# Patient Record
Sex: MALE | Race: WHITE
[De-identification: names, ages, dates, MRNs, and addresses within clinical notes are randomized per-mention and may not be internally consistent; named-entity substitution may affect disease eponyms.]

---

## 2019-10-12 ENCOUNTER — HOSPITAL ENCOUNTER (EMERGENCY)
Dept: HOSPITAL 46 - ED | Age: 49
Discharge: HOME | End: 2019-10-12
Payer: COMMERCIAL

## 2019-10-12 VITALS — HEIGHT: 71 IN | WEIGHT: 184.99 LBS | BODY MASS INDEX: 25.9 KG/M2

## 2019-10-12 DIAGNOSIS — S22.41XA: Primary | ICD-10-CM
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
## 2019-10-12 PROCEDURE — A9270 NON-COVERED ITEM OR SERVICE: HCPCS

## 2019-10-12 NOTE — NUR
I CHECKED IN ON THIS PT AS HE IS KNOWN TO ME. I AM ARRANGING FOR THE PT TO
HAVE A RIDE HOME AND WILL GET HIM SOME FOOD BEFORE HE GOES HOME.

## 2019-10-14 ENCOUNTER — HOSPITAL ENCOUNTER (EMERGENCY)
Dept: HOSPITAL 46 - ED | Age: 49
Discharge: HOME | End: 2019-10-14
Payer: COMMERCIAL

## 2019-10-14 VITALS — BODY MASS INDEX: 0.12 KG/M2 | WEIGHT: 0.83 LBS | HEIGHT: 71 IN

## 2019-10-14 DIAGNOSIS — F17.200: ICD-10-CM

## 2019-10-14 DIAGNOSIS — S22.41XD: ICD-10-CM

## 2019-10-14 DIAGNOSIS — S27.0XXD: Primary | ICD-10-CM
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
## 2020-08-04 ENCOUNTER — HOSPITAL ENCOUNTER (EMERGENCY)
Dept: HOSPITAL 46 - ED | Age: 50
Discharge: HOME | End: 2020-08-04
Payer: MEDICARE

## 2020-08-04 VITALS — WEIGHT: 170.99 LBS | BODY MASS INDEX: 23.94 KG/M2 | HEIGHT: 71 IN

## 2020-08-04 DIAGNOSIS — S50.312A: ICD-10-CM
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
## 2020-08-04 DIAGNOSIS — S80.212A: ICD-10-CM

## 2020-08-04 DIAGNOSIS — S50.311A: ICD-10-CM

## 2020-08-04 DIAGNOSIS — S00.83XA: Primary | ICD-10-CM

## 2020-08-04 DIAGNOSIS — F17.200: ICD-10-CM

## 2020-08-04 DIAGNOSIS — S80.211A: ICD-10-CM
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
## 2020-08-12 ENCOUNTER — HOSPITAL ENCOUNTER (EMERGENCY)
Dept: HOSPITAL 46 - ED | Age: 50
Discharge: HOME | End: 2020-08-12
Payer: MEDICARE

## 2020-08-12 VITALS — BODY MASS INDEX: 23.94 KG/M2 | WEIGHT: 170.99 LBS | HEIGHT: 71 IN

## 2020-08-12 DIAGNOSIS — S22.32XA: Primary | ICD-10-CM
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
## 2020-08-12 DIAGNOSIS — F17.200: ICD-10-CM
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
## 2020-08-12 NOTE — EKG
[facility]
                                    [address]
                                  [place], [place]  [address]
_________________________________________________________________________________________
                                                                 Signed   
 
 
Normal sinus rhythm
Left axis deviation
Abnormal ECG
No previous ECGs available
Confirmed by SUKHWINDER PANIAGUA MD ([id number]) on 8/12/2020 4:14:55 PM
 
 
 
 
 
 
 
 
 
 
 
 
 
 
 
 
 
 
 
 
 
 
 
 
 
 
 
 
 
 
 
 
 
 
 
 
 
 
 
 
    Electronically Signed By: SUKHWINDER PANIAGUA MD  08/12/20 1615
_________________________________________________________________________________________
PATIENT NAME:     SAMINA MARKS [place]               
MEDICAL RECORD #: [id number]                     Electrocardiogram             
          ACCT #: [id number]  
DATE OF BIRTH:   [birth year]                                       
PHYSICIAN:   SUKHWINDER PANIAGUA MD           REPORT #: [id number]
REPORT IS CONFIDENTIAL AND NOT TO BE RELEASED WITHOUT AUTHORIZATION

## 2022-05-21 ENCOUNTER — HOSPITAL ENCOUNTER (EMERGENCY)
Dept: HOSPITAL 46 - ED | Age: 52
Discharge: HOME | End: 2022-05-21
Payer: MEDICARE

## 2022-05-21 VITALS — WEIGHT: 171.81 LBS | BODY MASS INDEX: 24.05 KG/M2 | HEIGHT: 71 IN

## 2022-05-21 DIAGNOSIS — M19.90: ICD-10-CM

## 2022-05-21 DIAGNOSIS — F17.200: ICD-10-CM

## 2022-05-21 DIAGNOSIS — S80.01XA: ICD-10-CM
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
## 2022-05-21 DIAGNOSIS — S43.402A: Primary | ICD-10-CM
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
